# Patient Record
Sex: FEMALE | Race: BLACK OR AFRICAN AMERICAN | Employment: UNEMPLOYED | URBAN - METROPOLITAN AREA
[De-identification: names, ages, dates, MRNs, and addresses within clinical notes are randomized per-mention and may not be internally consistent; named-entity substitution may affect disease eponyms.]

---

## 2024-06-22 ENCOUNTER — APPOINTMENT (OUTPATIENT)
Dept: GENERAL RADIOLOGY | Age: 24
End: 2024-06-22
Payer: COMMERCIAL

## 2024-06-22 ENCOUNTER — HOSPITAL ENCOUNTER (EMERGENCY)
Age: 24
Discharge: HOME | End: 2024-06-22
Attending: EMERGENCY MEDICINE
Payer: COMMERCIAL

## 2024-06-22 VITALS
RESPIRATION RATE: 21 BRPM | DIASTOLIC BLOOD PRESSURE: 91 MMHG | OXYGEN SATURATION: 100 % | HEART RATE: 118 BPM | SYSTOLIC BLOOD PRESSURE: 153 MMHG | TEMPERATURE: 99 F

## 2024-06-22 DIAGNOSIS — R06.02 SHORTNESS OF BREATH: ICD-10-CM

## 2024-06-22 DIAGNOSIS — Z53.29 LEFT AGAINST MEDICAL ADVICE: ICD-10-CM

## 2024-06-22 DIAGNOSIS — R07.9 CHEST PAIN, UNSPECIFIED TYPE: Primary | ICD-10-CM

## 2024-06-22 LAB
ALBUMIN SERPL-MCNC: 3.6 G/DL (ref 3.5–5.2)
ALP SERPL-CCNC: 98 U/L (ref 35–104)
ALT SERPL-CCNC: 10 U/L (ref 0–32)
ANION GAP SERPL CALCULATED.3IONS-SCNC: 12 MMOL/L (ref 7–16)
AST SERPL-CCNC: 10 U/L (ref 0–31)
BASOPHILS # BLD: 0.02 K/UL (ref 0–0.2)
BASOPHILS NFR BLD: 0 % (ref 0–2)
BILIRUB SERPL-MCNC: <0.2 MG/DL (ref 0–1.2)
BUN SERPL-MCNC: 11 MG/DL (ref 6–20)
CALCIUM SERPL-MCNC: 9.1 MG/DL (ref 8.6–10.2)
CHLORIDE SERPL-SCNC: 106 MMOL/L (ref 98–107)
CO2 SERPL-SCNC: 22 MMOL/L (ref 22–29)
CREAT SERPL-MCNC: 1.1 MG/DL (ref 0.5–1)
D-DIMER QUANTITATIVE: <200 NG/ML DDU (ref 0–230)
EKG ATRIAL RATE: 132 BPM
EKG P AXIS: 34 DEGREES
EKG P-R INTERVAL: 134 MS
EKG Q-T INTERVAL: 298 MS
EKG QRS DURATION: 78 MS
EKG QTC CALCULATION (BAZETT): 441 MS
EKG R AXIS: -21 DEGREES
EKG T AXIS: 24 DEGREES
EKG VENTRICULAR RATE: 132 BPM
EOSINOPHIL # BLD: 0.09 K/UL (ref 0.05–0.5)
EOSINOPHILS RELATIVE PERCENT: 1 % (ref 0–6)
ERYTHROCYTE [DISTWIDTH] IN BLOOD BY AUTOMATED COUNT: 18.3 % (ref 11.5–15)
GFR, ESTIMATED: 74 ML/MIN/1.73M2
GLUCOSE SERPL-MCNC: 113 MG/DL (ref 74–99)
HCT VFR BLD AUTO: 34.3 % (ref 34–48)
HGB BLD-MCNC: 10.6 G/DL (ref 11.5–15.5)
IMM GRANULOCYTES # BLD AUTO: 0.04 K/UL (ref 0–0.58)
IMM GRANULOCYTES NFR BLD: 1 % (ref 0–5)
LYMPHOCYTES NFR BLD: 2.39 K/UL (ref 1.5–4)
LYMPHOCYTES RELATIVE PERCENT: 30 % (ref 20–42)
MCH RBC QN AUTO: 23.5 PG (ref 26–35)
MCHC RBC AUTO-ENTMCNC: 30.9 G/DL (ref 32–34.5)
MCV RBC AUTO: 76.1 FL (ref 80–99.9)
MONOCYTES NFR BLD: 0.75 K/UL (ref 0.1–0.95)
MONOCYTES NFR BLD: 9 % (ref 2–12)
NEUTROPHILS NFR BLD: 59 % (ref 43–80)
NEUTS SEG NFR BLD: 4.75 K/UL (ref 1.8–7.3)
PLATELET # BLD AUTO: 316 K/UL (ref 130–450)
PMV BLD AUTO: 11 FL (ref 7–12)
POTASSIUM SERPL-SCNC: 4.1 MMOL/L (ref 3.5–5)
PROT SERPL-MCNC: 7.2 G/DL (ref 6.4–8.3)
RBC # BLD AUTO: 4.51 M/UL (ref 3.5–5.5)
SODIUM SERPL-SCNC: 140 MMOL/L (ref 132–146)
TROPONIN I SERPL HS-MCNC: 16 NG/L (ref 0–9)
WBC OTHER # BLD: 8 K/UL (ref 4.5–11.5)

## 2024-06-22 PROCEDURE — 71045 X-RAY EXAM CHEST 1 VIEW: CPT

## 2024-06-22 PROCEDURE — 93005 ELECTROCARDIOGRAM TRACING: CPT

## 2024-06-22 PROCEDURE — 94640 AIRWAY INHALATION TREATMENT: CPT

## 2024-06-22 PROCEDURE — 80053 COMPREHEN METABOLIC PANEL: CPT

## 2024-06-22 PROCEDURE — 85025 COMPLETE CBC W/AUTO DIFF WBC: CPT

## 2024-06-22 PROCEDURE — 85379 FIBRIN DEGRADATION QUANT: CPT

## 2024-06-22 PROCEDURE — 6360000002 HC RX W HCPCS

## 2024-06-22 PROCEDURE — 96374 THER/PROPH/DIAG INJ IV PUSH: CPT

## 2024-06-22 PROCEDURE — 2580000003 HC RX 258

## 2024-06-22 PROCEDURE — 6370000000 HC RX 637 (ALT 250 FOR IP)

## 2024-06-22 PROCEDURE — 99285 EMERGENCY DEPT VISIT HI MDM: CPT

## 2024-06-22 PROCEDURE — 84484 ASSAY OF TROPONIN QUANT: CPT

## 2024-06-22 PROCEDURE — 94664 DEMO&/EVAL PT USE INHALER: CPT

## 2024-06-22 RX ORDER — LORAZEPAM 1 MG/1
1 TABLET ORAL ONCE
Status: DISCONTINUED | OUTPATIENT
Start: 2024-06-22 | End: 2024-06-22 | Stop reason: HOSPADM

## 2024-06-22 RX ORDER — 0.9 % SODIUM CHLORIDE 0.9 %
1000 INTRAVENOUS SOLUTION INTRAVENOUS ONCE
Status: COMPLETED | OUTPATIENT
Start: 2024-06-22 | End: 2024-06-22

## 2024-06-22 RX ORDER — 0.9 % SODIUM CHLORIDE 0.9 %
1000 INTRAVENOUS SOLUTION INTRAVENOUS ONCE
Status: DISCONTINUED | OUTPATIENT
Start: 2024-06-22 | End: 2024-06-22 | Stop reason: HOSPADM

## 2024-06-22 RX ORDER — IPRATROPIUM BROMIDE AND ALBUTEROL SULFATE 2.5; .5 MG/3ML; MG/3ML
3 SOLUTION RESPIRATORY (INHALATION) ONCE
Status: COMPLETED | OUTPATIENT
Start: 2024-06-22 | End: 2024-06-22

## 2024-06-22 RX ADMIN — IPRATROPIUM BROMIDE AND ALBUTEROL SULFATE 3 DOSE: 2.5; .5 SOLUTION RESPIRATORY (INHALATION) at 15:35

## 2024-06-22 RX ADMIN — WATER 125 MG: 1 INJECTION INTRAMUSCULAR; INTRAVENOUS; SUBCUTANEOUS at 15:25

## 2024-06-22 RX ADMIN — SODIUM CHLORIDE 1000 ML: 9 INJECTION, SOLUTION INTRAVENOUS at 15:02

## 2024-06-22 NOTE — ED PROVIDER NOTES
encounters were reviewed, see [facility] for details.        ------------------------------ ED COURSE/MEDICAL DECISION MAKING----------------------  Medications   sodium chloride 0.9 % bolus 1,000 mL (has no administration in time range)   LORazepam (ATIVAN) tablet 1 mg (has no administration in time range)   ipratropium 0.5 mg-albuterol 2.5 mg (DUONEB) nebulizer solution 3 Dose (3 Doses Inhalation Given 6/22/24 1535)   methylPREDNISolone sodium succ (SOLU-MEDROL) 125 mg in sterile water 2 mL injection (125 mg IntraVENous Given 6/22/24 1525)   sodium chloride 0.9 % bolus 1,000 mL (0 mLs IntraVENous Stopped 6/22/24 1706)            Medical Decision Making:     ED Course as of 06/22/24 1925   Sat Jun 22, 2024   1908 Long discussion was had with patient and friend at bedside. They are adamant about leaving against medical advice. Risks vs benefits of staying to complete workup vs leaving against medical advice were discussed with patient and friend. They insist upon leaving against medical advice. They understanding they are at higher risk of dying compared to the average person.  [CP]   1917 Chest x-ray interpreted by me, no acute process [HH]   1918 EKG Interpretation  Interpreted by emergency department physician, Dr. Vaughn    Rhythm: sinus tachycardia  Rate: 130-140  Axis: normal  Ectopy: none  Conduction: normal  ST Segments: no acute change  T Waves: no acute change  Q Waves: none    Clinical Impression: sinus tachycardia   [HH]      ED Course User Index  [CP] Adrienne Manriquez DO  [HH] Breanne Vaughn DO       Medical Decision Making  Patient is a 23 y.o. presenting to the ED for shortness of breath.  Also complaining of chest pain and tachycardic on examination.  Given symptoms, concern for differentials as listed below.  Therefore, workup was obtained.  CBC revealing only mild anemia at 10.6 otherwise unremarkable.  D-dimer less than 200 thus unlikely PE.  CMP unremarkable aside from creatinine of 1.1.  Initial troponin  patient was seen and examined by attending physician. Plan of care and disposition discussed with attending physician and they were immediately available or present for all procedures performed.       -- Adrienne Manriquez D.O. PGY-2     Emergency Medicine      6/22/2024 7:25 PM

## 2024-06-22 NOTE — ED PROVIDER NOTES
(DUONEB) nebulizer solution 3 Dose (3 Doses Inhalation Given 6/22/24 1535)   methylPREDNISolone sodium succ (SOLU-MEDROL) 125 mg in sterile water 2 mL injection (125 mg IntraVENous Given 6/22/24 1525)   sodium chloride 0.9 % bolus 1,000 mL (0 mLs IntraVENous Stopped 6/22/24 1706)         Is this patient to be included in the SEP-1 Core Measure due to severe sepsis or septic shock?   No   Exclusion criteria - the patient is NOT to be included for SEP-1 Core Measure due to:  Infection is not suspected          Medical Decision Making/Differential Diagnosis:    CC/HPI Summary, Social Determinants of health, Records Reviewed, DDx, testing done/not done, ED Course, Reassessment, disposition considerations/shared decision making with patient, consults, disposition:        ED Course as of 06/22/24 1919   Sat Jun 22, 2024 1908 Long discussion was had with patient and friend at bedside. They are adamant about leaving against medical advice. Risks vs benefits of staying to complete workup vs leaving against medical advice were discussed with patient and friend. They insist upon leaving against medical advice. They understanding they are at higher risk of dying compared to the average person.  [CP]   1917 Chest x-ray interpreted by me, no acute process [HH]   1918 EKG Interpretation  Interpreted by emergency department physician, Dr. Vaughn    Rhythm: sinus tachycardia  Rate: 130-140  Axis: normal  Ectopy: none  Conduction: normal  ST Segments: no acute change  T Waves: no acute change  Q Waves: none    Clinical Impression: sinus tachycardia   [HH]      ED Course User Index  [CP] Adrienne Manriquez DO  [HH] Breanne Vaughn DO       Medical Decision Making  Amount and/or Complexity of Data Reviewed  Labs: ordered.  Radiology: ordered.  ECG/medicine tests: ordered.    Risk  Prescription drug management.        MDM:  The differential diagnosis associated with the patient’s presentation includes: Acute COPD exacerbation, Hypoxemic  instructed.  They have been advised that they should return to the ED immediately if they change their mind at any time, or if their condition begins to change or worsen.  This was witnessed by Dr Manriquez as well.        CONSULTS:   IP CONSULT TO SOCIAL WORK        PROCEDURES   Unless otherwise noted below, none       CRITICAL CARE TIME (.cct)   na        I am the Primary Clinician of Record.    FINAL IMPRESSION      1. Chest pain, unspecified type    2. Shortness of breath    3. Left against medical advice          DISPOSITION/PLAN     DISPOSITION [place] 06/22/2024 07:08:30 PM      PATIENT REFERRED TO:  [facility] Emergency Department  [address] [address]  [phone]  Go to   If symptoms worsen      DISCHARGE MEDICATIONS:  New Prescriptions    No medications on file       DISCONTINUED MEDICATIONS:  Discontinued Medications    No medications on file              (Please note that portions of this note were completed with a voice recognition program.  Efforts were made to edit the dictations but occasionally words are mis-transcribed.)    Breanne Vaughn DO (electronically signed)          Breanne Vaughn DO  06/22/24 1920

## 2024-06-22 NOTE — DISCHARGE INSTRUCTIONS
Followed up with Eben Woods on 6/22/2024 at 7:07 PM. Patient left the ED with a disposition of AMA on . Patient cited wait time and improved/resolved symptoms as reason. Advised patient to follow up with a primary care physician or return to the Emergency Department if symptoms worsen.   Adrienne Manriquez, DO

## 2024-06-24 LAB
EKG ATRIAL RATE: 132 BPM
EKG P AXIS: 34 DEGREES
EKG P-R INTERVAL: 134 MS
EKG Q-T INTERVAL: 298 MS
EKG QRS DURATION: 78 MS
EKG QTC CALCULATION (BAZETT): 441 MS
EKG R AXIS: -21 DEGREES
EKG T AXIS: 24 DEGREES
EKG VENTRICULAR RATE: 132 BPM

## 2024-06-24 PROCEDURE — 93010 ELECTROCARDIOGRAM REPORT: CPT | Performed by: INTERNAL MEDICINE

## 2025-04-05 ENCOUNTER — APPOINTMENT (OUTPATIENT)
Dept: CARDIOLOGY | Facility: HOSPITAL | Age: 25
End: 2025-04-05
Payer: MEDICAID

## 2025-04-05 ENCOUNTER — HOSPITAL ENCOUNTER (EMERGENCY)
Facility: HOSPITAL | Age: 25
Discharge: HOME | End: 2025-04-05
Attending: EMERGENCY MEDICINE
Payer: MEDICAID

## 2025-04-05 ENCOUNTER — APPOINTMENT (OUTPATIENT)
Dept: RADIOLOGY | Facility: HOSPITAL | Age: 25
End: 2025-04-05
Payer: MEDICAID

## 2025-04-05 VITALS
DIASTOLIC BLOOD PRESSURE: 87 MMHG | OXYGEN SATURATION: 97 % | BODY MASS INDEX: 47.09 KG/M2 | HEART RATE: 80 BPM | RESPIRATION RATE: 16 BRPM | WEIGHT: 293 LBS | HEIGHT: 66 IN | TEMPERATURE: 96.4 F | SYSTOLIC BLOOD PRESSURE: 137 MMHG

## 2025-04-05 DIAGNOSIS — G89.29 CHRONIC BILATERAL LOW BACK PAIN WITHOUT SCIATICA: Primary | ICD-10-CM

## 2025-04-05 DIAGNOSIS — M54.50 CHRONIC BILATERAL LOW BACK PAIN WITHOUT SCIATICA: Primary | ICD-10-CM

## 2025-04-05 LAB
ALBUMIN SERPL BCP-MCNC: 3.7 G/DL (ref 3.4–5)
ALP SERPL-CCNC: 53 U/L (ref 33–110)
ALT SERPL W P-5'-P-CCNC: 11 U/L (ref 7–45)
ANION GAP SERPL CALC-SCNC: 10 MMOL/L (ref 10–20)
AST SERPL W P-5'-P-CCNC: 13 U/L (ref 9–39)
B-HCG SERPL-ACNC: <2 MIU/ML
BASOPHILS # BLD AUTO: 0.01 X10*3/UL (ref 0–0.1)
BASOPHILS NFR BLD AUTO: 0.2 %
BILIRUB SERPL-MCNC: 0.3 MG/DL (ref 0–1.2)
BNP SERPL-MCNC: 10 PG/ML (ref 0–99)
BUN SERPL-MCNC: 11 MG/DL (ref 6–23)
CALCIUM SERPL-MCNC: 8.9 MG/DL (ref 8.6–10.3)
CARDIAC TROPONIN I PNL SERPL HS: <3 NG/L (ref 0–13)
CARDIAC TROPONIN I PNL SERPL HS: <3 NG/L (ref 0–13)
CHLORIDE SERPL-SCNC: 104 MMOL/L (ref 98–107)
CO2 SERPL-SCNC: 28 MMOL/L (ref 21–32)
CREAT SERPL-MCNC: 0.88 MG/DL (ref 0.5–1.05)
EGFRCR SERPLBLD CKD-EPI 2021: >90 ML/MIN/1.73M*2
EOSINOPHIL # BLD AUTO: 0.13 X10*3/UL (ref 0–0.7)
EOSINOPHIL NFR BLD AUTO: 2.3 %
ERYTHROCYTE [DISTWIDTH] IN BLOOD BY AUTOMATED COUNT: 19.5 % (ref 11.5–14.5)
GLUCOSE SERPL-MCNC: 107 MG/DL (ref 74–99)
HCT VFR BLD AUTO: 34.5 % (ref 36–46)
HGB BLD-MCNC: 10.1 G/DL (ref 12–16)
IMM GRANULOCYTES # BLD AUTO: 0.04 X10*3/UL (ref 0–0.7)
IMM GRANULOCYTES NFR BLD AUTO: 0.7 % (ref 0–0.9)
LYMPHOCYTES # BLD AUTO: 1.65 X10*3/UL (ref 1.2–4.8)
LYMPHOCYTES NFR BLD AUTO: 28.6 %
MCH RBC QN AUTO: 21.8 PG (ref 26–34)
MCHC RBC AUTO-ENTMCNC: 29.3 G/DL (ref 32–36)
MCV RBC AUTO: 74 FL (ref 80–100)
MONOCYTES # BLD AUTO: 0.61 X10*3/UL (ref 0.1–1)
MONOCYTES NFR BLD AUTO: 10.6 %
NEUTROPHILS # BLD AUTO: 3.32 X10*3/UL (ref 1.2–7.7)
NEUTROPHILS NFR BLD AUTO: 57.6 %
NRBC BLD-RTO: 0 /100 WBCS (ref 0–0)
PLATELET # BLD AUTO: 285 X10*3/UL (ref 150–450)
POTASSIUM SERPL-SCNC: 4.2 MMOL/L (ref 3.5–5.3)
PROT SERPL-MCNC: 6.9 G/DL (ref 6.4–8.2)
RBC # BLD AUTO: 4.64 X10*6/UL (ref 4–5.2)
SODIUM SERPL-SCNC: 138 MMOL/L (ref 136–145)
WBC # BLD AUTO: 5.8 X10*3/UL (ref 4.4–11.3)

## 2025-04-05 PROCEDURE — 83880 ASSAY OF NATRIURETIC PEPTIDE: CPT

## 2025-04-05 PROCEDURE — 84484 ASSAY OF TROPONIN QUANT: CPT | Performed by: EMERGENCY MEDICINE

## 2025-04-05 PROCEDURE — 96374 THER/PROPH/DIAG INJ IV PUSH: CPT

## 2025-04-05 PROCEDURE — 84702 CHORIONIC GONADOTROPIN TEST: CPT

## 2025-04-05 PROCEDURE — 36415 COLL VENOUS BLD VENIPUNCTURE: CPT

## 2025-04-05 PROCEDURE — 84075 ASSAY ALKALINE PHOSPHATASE: CPT

## 2025-04-05 PROCEDURE — 93005 ELECTROCARDIOGRAM TRACING: CPT

## 2025-04-05 PROCEDURE — 99285 EMERGENCY DEPT VISIT HI MDM: CPT | Performed by: EMERGENCY MEDICINE

## 2025-04-05 PROCEDURE — 84484 ASSAY OF TROPONIN QUANT: CPT

## 2025-04-05 PROCEDURE — 71045 X-RAY EXAM CHEST 1 VIEW: CPT

## 2025-04-05 PROCEDURE — 2500000001 HC RX 250 WO HCPCS SELF ADMINISTERED DRUGS (ALT 637 FOR MEDICARE OP)

## 2025-04-05 PROCEDURE — 71045 X-RAY EXAM CHEST 1 VIEW: CPT | Mod: FOREIGN READ | Performed by: RADIOLOGY

## 2025-04-05 PROCEDURE — 83880 ASSAY OF NATRIURETIC PEPTIDE: CPT | Performed by: EMERGENCY MEDICINE

## 2025-04-05 PROCEDURE — 2500000004 HC RX 250 GENERAL PHARMACY W/ HCPCS (ALT 636 FOR OP/ED)

## 2025-04-05 PROCEDURE — 85025 COMPLETE CBC W/AUTO DIFF WBC: CPT | Performed by: EMERGENCY MEDICINE

## 2025-04-05 PROCEDURE — 85025 COMPLETE CBC W/AUTO DIFF WBC: CPT

## 2025-04-05 PROCEDURE — 80053 COMPREHEN METABOLIC PANEL: CPT | Performed by: EMERGENCY MEDICINE

## 2025-04-05 RX ORDER — ALBUTEROL SULFATE 90 UG/1
2 INHALANT RESPIRATORY (INHALATION) EVERY 6 HOURS PRN
COMMUNITY

## 2025-04-05 RX ORDER — ORPHENADRINE CITRATE 100 MG/1
100 TABLET, EXTENDED RELEASE ORAL ONCE
Status: DISCONTINUED | OUTPATIENT
Start: 2025-04-05 | End: 2025-04-05 | Stop reason: HOSPADM

## 2025-04-05 RX ORDER — KETOROLAC TROMETHAMINE 15 MG/ML
15 INJECTION, SOLUTION INTRAMUSCULAR; INTRAVENOUS ONCE
Status: COMPLETED | OUTPATIENT
Start: 2025-04-05 | End: 2025-04-05

## 2025-04-05 RX ORDER — VITAMIN B COMPLEX
1 CAPSULE ORAL DAILY
COMMUNITY

## 2025-04-05 RX ORDER — ESCITALOPRAM OXALATE 20 MG/1
20 TABLET ORAL DAILY
COMMUNITY

## 2025-04-05 RX ORDER — METHOCARBAMOL 500 MG/1
500 TABLET, FILM COATED ORAL 2 TIMES DAILY
Qty: 14 TABLET | Refills: 0 | Status: SHIPPED | OUTPATIENT
Start: 2025-04-05 | End: 2025-04-12

## 2025-04-05 RX ORDER — ACETAMINOPHEN 500 MG
5000 TABLET ORAL DAILY
COMMUNITY

## 2025-04-05 RX ORDER — ASCORBIC ACID 250 MG
125 TABLET,CHEWABLE ORAL DAILY
COMMUNITY

## 2025-04-05 RX ORDER — ACETAMINOPHEN 325 MG/1
975 TABLET ORAL ONCE
Status: COMPLETED | OUTPATIENT
Start: 2025-04-05 | End: 2025-04-05

## 2025-04-05 RX ORDER — VIT C/E/ZN/COPPR/LUTEIN/ZEAXAN 250MG-90MG
500 CAPSULE ORAL DAILY
COMMUNITY

## 2025-04-05 RX ORDER — FERROUS SULFATE 325(65) MG
325 TABLET ORAL DAILY
COMMUNITY

## 2025-04-05 RX ORDER — KETOROLAC TROMETHAMINE 30 MG/ML
30 INJECTION, SOLUTION INTRAMUSCULAR; INTRAVENOUS ONCE
Status: DISCONTINUED | OUTPATIENT
Start: 2025-04-05 | End: 2025-04-05

## 2025-04-05 RX ADMIN — ACETAMINOPHEN 975 MG: 325 TABLET ORAL at 12:42

## 2025-04-05 RX ADMIN — KETOROLAC TROMETHAMINE 15 MG: 15 INJECTION, SOLUTION INTRAMUSCULAR; INTRAVENOUS at 12:58

## 2025-04-05 ASSESSMENT — LIFESTYLE VARIABLES
EVER HAD A DRINK FIRST THING IN THE MORNING TO STEADY YOUR NERVES TO GET RID OF A HANGOVER: NO
EVER FELT BAD OR GUILTY ABOUT YOUR DRINKING: NO
TOTAL SCORE: 0
HAVE YOU EVER FELT YOU SHOULD CUT DOWN ON YOUR DRINKING: NO
HAVE PEOPLE ANNOYED YOU BY CRITICIZING YOUR DRINKING: NO

## 2025-04-05 ASSESSMENT — PAIN SCALES - GENERAL
PAINLEVEL_OUTOF10: 8
PAINLEVEL_OUTOF10: 7

## 2025-04-05 ASSESSMENT — PAIN - FUNCTIONAL ASSESSMENT
PAIN_FUNCTIONAL_ASSESSMENT: UNABLE TO SELF-REPORT
PAIN_FUNCTIONAL_ASSESSMENT: UNABLE TO SELF-REPORT

## 2025-04-05 ASSESSMENT — COLUMBIA-SUICIDE SEVERITY RATING SCALE - C-SSRS
6. HAVE YOU EVER DONE ANYTHING, STARTED TO DO ANYTHING, OR PREPARED TO DO ANYTHING TO END YOUR LIFE?: NO
1. IN THE PAST MONTH, HAVE YOU WISHED YOU WERE DEAD OR WISHED YOU COULD GO TO SLEEP AND NOT WAKE UP?: NO
2. HAVE YOU ACTUALLY HAD ANY THOUGHTS OF KILLING YOURSELF?: NO

## 2025-04-05 NOTE — DISCHARGE INSTRUCTIONS
"\"You were seen in the Emergency Department (ED) for back pain. Your work-up and exam was unimpressive for life or limb-threatening cause at this time requiring admission.    You have received prescription for muscle relaxant.  Use the prescribed medication as written.  Please utilize anti-inflammatories acetaminophen (Tylenol) and ibuprofen (Advil) or naproxen (aleve) for your pain as recommended. You can take both acetaminophen and ibuprofen together.    Follow-up with your primary care physician within 48 hours regarding your ED visit.    Seek immediate medical attention should you have:  Numbness, tingling, weakness, fevers of 38C (100.4) or higher, chills, nausea, palpitations, confusion, vomiting, dizziness, painful urination, inability to control your urine or bowel movements, or any other concerning symptoms.\"  "

## 2025-04-05 NOTE — ED PROVIDER NOTES
Emergency Department Provider Note        History of Present Illness     History provided by: Patient  Limitations to History: None  External Records Reviewed with Brief Summary: None    HPI: OLIVER, and PTSD,  Mir Anderson is a 24 y.o. morbid obese female with a history of MDD, who presents to the ED with complaint of chest pain and shortness of breath.  Patient reports    Physical Exam   Triage vitals:  T 36.8 °C (98.2 °F)  HR 83  /83  RR 17  O2 98 % None (Room air)    General: Awake, alert, in no acute distress  Eyes: Gaze conjugate.  No scleral icterus or injection  HENT: Normo-cephalic, atraumatic. No stridor  CV: Regular rate, regular rhythm. Radial pulses 2+ bilaterally  Resp: Breathing non-labored, speaking in full sentences.  Clear to auscultation bilaterally  GI: Soft, non-distended, non-tender. No rebound or guarding.  : Not examined.  MSK/Extremities: No gross bony deformities. Moving all extremities  Skin: Warm. Appropriate color  Neuro: Alert. Oriented. Face symmetric. Speech is fluent.  Gross strength and sensation intact in b/l UE and LEs  Psych: Appropriate mood and affect    Medical Decision Making & ED Course   Medical Decision Makin y.o. morbid obese female with a history of MDD, who presents to the ED with complaint of chest pain and shortness of breath.    ED EKG shows normal sinus rhythm with ventricular rate of 79 bpm, RI interval of 154 ms, QRS duration of 78 ms, QTc of 433 ms, and PRT axis of 18/negative/6.  No ST elevation.      ----  Scoring Tools Utilized:       Differential diagnoses considered include but are not limited to: ***     Social Determinants of Health which Significantly Impact Care: None identified {The following actions were taken to address these social determinants:75348}    EKG Independent Interpretation: EKG interpreted by myself. Please see ED Course for full interpretation.    Independent Result Review and Interpretation: Relevant laboratory and  "radiographic results were reviewed and independently interpreted by myself.  As necessary, they are commented on in the ED Course.    Chronic conditions affecting the patient's care: As documented above in MDM    The patient was discussed with the following consultants/services: {The patient was discussed with the following consultants/services:10386::\"None\"}    Care Considerations: As documented above in [facility]    ED Course:     Disposition   {ED Disposition:35613}    Procedures   Procedures    This was a shared visit with an ED attending.  The patient was seen and discussed with the ED attending Dr. Yang.    Hoang Chen MD  Emergency Medicine  " return precautions.    ----  Scoring Tools Utilized:       Differential diagnoses considered include but are not limited to: Sciatica,     Social Determinants of Health which Significantly Impact Care: None identified     EKG Independent Interpretation: EKG interpreted by myself. Please see ED Course for full interpretation.    Independent Result Review and Interpretation: Relevant laboratory and radiographic results were reviewed and independently interpreted by myself.  As necessary, they are commented on in the ED Course.    Chronic conditions affecting the patient's care: As documented above in [facility]    The patient was discussed with the following consultants/services: None    Care Considerations: As documented above in [facility]    ED Course:  Diagnoses as of 04/09/25 1422   Chronic bilateral low back pain without sciatica     Disposition   As a result of the work-up, the patient was discharged home.  she was informed of her diagnosis and instructed to come back with any concerns or worsening of condition.  she and was agreeable to the plan as discussed above.  she was given the opportunity to ask questions.  All of the patient's questions were answered.    Procedures   Procedures    This was a shared visit with an ED attending.  The patient was seen and discussed with the ED attending Dr. Yang.    Hoang Chen MD  Emergency Medicine, PGY-2     Hoang Chen MD  Resident  04/09/25 8425      The patient was seen by the resident/fellow.  I have personally performed a substantive portion of the encounter.  I have seen and examined the patient; agree with the workup, evaluation, MDM, management and diagnosis.  The care plan has been discussed with the resident; I have reviewed the resident’s note and agree with the documented findings.      Patient has what appears to be reproducible chest pain, and otherwise has 2 negative enzymes, and low risk for pulmonary embolism.  Given the reproducibility, the  symptoms look much musculoskeletal in nature.  She responded well to medications, and wrote that she feels comfortable going home at this time.  Will set the patient up for a ride home, she did request food while waiting, for her ride back to the facility.                                              Stepan Yang, DO  04/10/25 1144

## 2025-04-13 LAB
ATRIAL RATE: 79 BPM
ATRIAL RATE: 81 BPM
P AXIS: 14 DEGREES
P AXIS: 18 DEGREES
P OFFSET: 183 MS
P OFFSET: 184 MS
P ONSET: 131 MS
P ONSET: 135 MS
PR INTERVAL: 154 MS
PR INTERVAL: 162 MS
Q ONSET: 212 MS
Q ONSET: 212 MS
QRS COUNT: 13 BEATS
QRS COUNT: 13 BEATS
QRS DURATION: 78 MS
QRS DURATION: 78 MS
QT INTERVAL: 378 MS
QT INTERVAL: 380 MS
QTC CALCULATION(BAZETT): 433 MS
QTC CALCULATION(BAZETT): 441 MS
QTC FREDERICIA: 414 MS
QTC FREDERICIA: 419 MS
R AXIS: -10 DEGREES
R AXIS: -4 DEGREES
T AXIS: 6 DEGREES
T AXIS: 9 DEGREES
T OFFSET: 401 MS
T OFFSET: 402 MS
VENTRICULAR RATE: 79 BPM
VENTRICULAR RATE: 81 BPM